# Patient Record
Sex: FEMALE | Race: ASIAN | NOT HISPANIC OR LATINO | URBAN - METROPOLITAN AREA
[De-identification: names, ages, dates, MRNs, and addresses within clinical notes are randomized per-mention and may not be internally consistent; named-entity substitution may affect disease eponyms.]

---

## 2024-10-14 ENCOUNTER — EMERGENCY (EMERGENCY)
Facility: HOSPITAL | Age: 59
LOS: 1 days | Discharge: HOME | End: 2024-10-14
Admitting: EMERGENCY MEDICINE
Payer: COMMERCIAL

## 2024-10-14 VITALS — HEART RATE: 104 BPM | OXYGEN SATURATION: 97 % | RESPIRATION RATE: 17 BRPM

## 2024-10-14 VITALS
HEART RATE: 115 BPM | WEIGHT: 139.99 LBS | OXYGEN SATURATION: 98 % | HEIGHT: 64 IN | RESPIRATION RATE: 16 BRPM | TEMPERATURE: 98 F | SYSTOLIC BLOOD PRESSURE: 179 MMHG | DIASTOLIC BLOOD PRESSURE: 81 MMHG

## 2024-10-14 PROCEDURE — 99284 EMERGENCY DEPT VISIT MOD MDM: CPT | Mod: 25

## 2024-10-14 PROCEDURE — 70450 CT HEAD/BRAIN W/O DYE: CPT | Mod: 26,MC

## 2024-10-14 PROCEDURE — 99284 EMERGENCY DEPT VISIT MOD MDM: CPT

## 2024-10-14 PROCEDURE — 70486 CT MAXILLOFACIAL W/O DYE: CPT | Mod: MC

## 2024-10-14 PROCEDURE — 70486 CT MAXILLOFACIAL W/O DYE: CPT | Mod: 26,MC

## 2024-10-14 PROCEDURE — 70450 CT HEAD/BRAIN W/O DYE: CPT | Mod: MC

## 2024-10-14 NOTE — ED PROVIDER NOTE - PHYSICAL EXAMINATION
GEN: WD/WN, NAD  HEAD: Left sided periorbital swelling and ecchymosis.  No depressed skull fx. No Kraus sign.    NEURO: Alert, oriented. CN II-XII intact. Clear speech.  SILT all ext. Motor 5/5 all ext.  Gait steady.  Romberg neg. F-N intact.  EYE: EOMI. No nystagmus. PERRL, no hyphema.   ENT: Airway patent.  No dental injury. Small amt dry blood in right nostril, mild nasal tenderness, no septal hematoma appreciated. No otorrhea or hemotympanum.  PULM: No resp distress. Lungs CTA bilat.  CV: RRR, S1S2  GI: Abdomen soft, nontender  MSK: Neck with painless ROM; no tenderness over midline cervical region or back. No tenderness over ribcage.  Extremities without tenderness, swelling, or ROM limitation.  SKIN: Intact.  Normal color and turgor.

## 2024-10-14 NOTE — ED PROVIDER NOTE - PATIENT PORTAL LINK FT
You can access the FollowMyHealth Patient Portal offered by [facility] by registering at the following website: [email or web address]. By joining [facility] FollowMyHealth portal, you will also be able to view your health information using other applications (apps) compatible with our system.
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
## 2024-10-14 NOTE — ED PROVIDER NOTE - NSFOLLOWUPINSTRUCTIONS_ED_ALL_ED_FT
You can use Tylenol if needed for pain.  Sleep with head elevated.  Take the antibiotic as prescribed.  Use nasal saline drops.    Follow up with your primary care physician/OMFS (facial surgeon) in the next 1-3 days.  Return to the Emergency Department if you have any new or worsening symptoms, or if you have any concerns.  ====================  Maxillofacial Fracture  A maxillofacial fracture, also called a midface fracture, is a break in the bones of the middle part of the face that form the upper jaw (maxilla). These bones include:  The maxilla.  The cheekbones.  The lower rim and floor of the eye socket (inferior orbital or orbital floor).  The opening to the nasal passages (nasal cavity).  Sometimes, maxillofacial fractures involve multiple facial bones, also called complex fractures, and may partially or completely detach from the skull ([place] fractures). [place] fractures can be very severe and can be life-threatening.    What are the causes?  Maxillofacial fractures are usually caused by strong, blunt force to the midface area. Causes include:  Motor vehicle accidents.  Contact sports accidents.  Combat sports or martial arts.  Injuries in sports that use hard balls or bats.  Falls.  Workplace accidents.  Violent assaults.  What are the signs or symptoms?  Symptoms of this condition include:  Swelling, bruising, and pain, or tenderness of the face.  Bleeding or blood clots in the nose or mouth.  Clear drainage from the nose.  A change in the appearance of the face (facial deformity) and numbness.  A change in how the teeth fit together (malocclusion).  Inability to close the mouth at all.  Double vision, blurry vision, or inability to move the affected eye normally.  Bleeding into the lining of the eyelid or white area of the eye (subconjunctival hemorrhage).  Trouble breathing, swallowing, or speaking.  How is this diagnosed?  This condition may be diagnosed based on your symptoms and a physical exam. The exam involves checking for:  Airway blockage and any life-threatening bleeding.  Facial deformity, such as a gap or dent in the upper jaw that can be felt or moved, or widening and flattening of the face.  Vision problems.  Numbness or paralysis of eye muscles or facial muscles, or both.  Damage to the teeth and to the inside of the mouth and nose.  You may also have tests, such as X-rays or a CT scan, to confirm your diagnosis and determine how severe your fracture is.    How is this treated?  Early treatment    Treatment depends on how severe the fracture is and where it is found. Treatment may start in the emergency room to make sure blood clots or swelling do not block breathing. Treatments may include:  Endotracheal tube. This is a breathing tube that may be put through the nose into the windpipe.  Tracheostomy. This procedure involves making an opening in the lower windpipe to put in a breathing tube.  Antibiotic medicines, pain medicines, and medicines to reduce swelling.  Treatments specific to the type of fracture.  Maxillomandibular fixation    In most cases, this condition may require a procedure to wire the upper teeth to the lower teeth (maxillomandibular fixation). You may need to have your teeth wired together for several weeks to stabilize the fracture during healing. For minor fractures, this may be the only treatment needed.    Fractures that are out of position (displaced) or unstable may require open reduction. This is a surgery to move the broken bones back into position and then support them with plates and screws or wires.    Follow these instructions at home:  Medicines    Take over-the-counter and prescription medicines only as told by your health care provider.  Take your antibiotic medicine as told by your health care provider. Do not stop taking the antibiotic even if you start to feel better.  Maxillomandibular fixation care    If your teeth have been wired together:  Follow instructions for caring for your mouth and teeth (oral hygiene).  Make sure you know what to do if you vomit. You may be given a  to cut the wires off your teeth.  Follow instructions from your health care provider about eating or drinking restrictions. You will need to remain on a liquid and pureed food diet while your teeth are wired together.  Incision care    Two stitched incisions. One is normal. The other is red with pus and infected.  If you have facial incisions, follow instructions from your health care provider about how to take care of your incisions. Make sure you:  Wash your hands with soap and water for at least 20 seconds before and after you change your bandage (dressing). If soap and water are not available, use hand .  Change your dressing as told by your health care provider.  Leave stitches (sutures), skin glue, or adhesive strips in place. These skin closures may need to stay in place for 2 weeks or longer. If adhesive strip edges start to loosen and curl up, you may trim the loose edges. Do not remove adhesive strips completely unless your health care provider tells you to do that.  Check your incision area every day for signs of infection. Check for:  More redness, swelling, or pain.  More fluid or blood.  Warmth.  Pus or a bad smell.  Managing pain, stiffness, and swelling    A bag of ice on a towel on the skin.   If directed, put ice on the affected area. To do this:  Put ice in a plastic bag.  Place a towel between your skin and the bag.  Leave the ice on for 20 minutes, 2–3 times a day.  Remove the ice if your skin turns bright red. This is very important. If you cannot feel pain, heat, or cold, you have a greater risk of damage to the area.  Raise (elevate) your head above the level of your heart while you are sitting or lying down.  General instructions    Do not take baths, swim, or use a hot tub until your health care provider approves. Ask your health care provider if you may take showers. You may only be allowed to take sponge baths.  Return to your normal activities as told by your health care provider. Ask your health care provider what activities are safe for you.  Do not use any products that contain nicotine or tobacco. These products include cigarettes, chewing tobacco, and vaping devices, such as e-cigarettes. If you need help quitting, ask your health care provider.  Do not drink alcohol.  Do not lift anything that is heavier than 10 lb (4.5 kg), or the limit that you are told, until your health care provider says that it is safe.  Keep all follow-up visits. This is important. This includes visits to have sutures removed and wires cut from your teeth.  Contact a health care provider if:  You have chills or a fever.  You notice any signs of infection. These include redness, increased pain, or foul-smelling discharge.  Your pain is not controlled with medicine.  Get help right away if:  You have trouble breathing.  You have a sudden increase in swelling.  You need to cut the wires off your teeth because of vomiting.  These symptoms may represent a serious problem that is an emergency. Do not wait to see if the symptoms will go away. Get medical help right away. Call your local emergency services (911 in the [place] Do not drive yourself to the hospital.    Summary  A maxillofacial fracture is a break in the bones of the middle part of your face. This injury is usually caused by strong, blunt force to the midface area. Fractures in these bones may interfere with breathing, vision, chewing, and talking. Maxillofacial fractures can be very severe.  Treatment of maxillofacial fractures depends on the severity and location of the fracture. Treatment may start in the emergency room to make sure that blood clots or swelling are not blocking breathing.  In most cases, this condition may require a procedure to wire the upper teeth to the lower teeth (maxillomandibular fixation).  Severe fractures may require a surgical procedure (open reduction) to move the broken bones back into place and put in plates and screws or wires.  Follow all home care instructions and keep all follow-up visits.  This information is not intended to replace advice given to you by your health care provider. Make sure you discuss any questions you have with your health care provider. Cold compresses.  You can use Tylenol if needed for pain.  Sleep with head elevated.  Take the antibiotic as prescribed.  Use nasal saline drops.    Follow up with your primary care physician/OMFS (facial surgeon) in the next 1-3 days.  Please discuss your exertional shortness of breath (ongoing for past few years) with your primary care physician.      Return to the Emergency Department if you have any new or worsening symptoms, or if you have any concerns.  ====================  Maxillofacial Fracture  A maxillofacial fracture, also called a midface fracture, is a break in the bones of the middle part of the face that form the upper jaw (maxilla). These bones include:  The maxilla.  The cheekbones.  The lower rim and floor of the eye socket (inferior orbital or orbital floor).  The opening to the nasal passages (nasal cavity).  Sometimes, maxillofacial fractures involve multiple facial bones, also called complex fractures, and may partially or completely detach from the skull ([place] fractures). [place] fractures can be very severe and can be life-threatening.    What are the causes?  Maxillofacial fractures are usually caused by strong, blunt force to the midface area. Causes include:  Motor vehicle accidents.  Contact sports accidents.  Combat sports or martial arts.  Injuries in sports that use hard balls or bats.  Falls.  Workplace accidents.  Violent assaults.  What are the signs or symptoms?  Symptoms of this condition include:  Swelling, bruising, and pain, or tenderness of the face.  Bleeding or blood clots in the nose or mouth.  Clear drainage from the nose.  A change in the appearance of the face (facial deformity) and numbness.  A change in how the teeth fit together (malocclusion).  Inability to close the mouth at all.  Double vision, blurry vision, or inability to move the affected eye normally.  Bleeding into the lining of the eyelid or white area of the eye (subconjunctival hemorrhage).  Trouble breathing, swallowing, or speaking.  How is this diagnosed?  This condition may be diagnosed based on your symptoms and a physical exam. The exam involves checking for:  Airway blockage and any life-threatening bleeding.  Facial deformity, such as a gap or dent in the upper jaw that can be felt or moved, or widening and flattening of the face.  Vision problems.  Numbness or paralysis of eye muscles or facial muscles, or both.  Damage to the teeth and to the inside of the mouth and nose.  You may also have tests, such as X-rays or a CT scan, to confirm your diagnosis and determine how severe your fracture is.    How is this treated?  Early treatment    Treatment depends on how severe the fracture is and where it is found. Treatment may start in the emergency room to make sure blood clots or swelling do not block breathing. Treatments may include:  Endotracheal tube. This is a breathing tube that may be put through the nose into the windpipe.  Tracheostomy. This procedure involves making an opening in the lower windpipe to put in a breathing tube.  Antibiotic medicines, pain medicines, and medicines to reduce swelling.  Treatments specific to the type of fracture.  Maxillomandibular fixation    In most cases, this condition may require a procedure to wire the upper teeth to the lower teeth (maxillomandibular fixation). You may need to have your teeth wired together for several weeks to stabilize the fracture during healing. For minor fractures, this may be the only treatment needed.    Fractures that are out of position (displaced) or unstable may require open reduction. This is a surgery to move the broken bones back into position and then support them with plates and screws or wires.    Follow these instructions at home:  Medicines    Take over-the-counter and prescription medicines only as told by your health care provider.  Take your antibiotic medicine as told by your health care provider. Do not stop taking the antibiotic even if you start to feel better.  Maxillomandibular fixation care    If your teeth have been wired together:  Follow instructions for caring for your mouth and teeth (oral hygiene).  Make sure you know what to do if you vomit. You may be given a  to cut the wires off your teeth.  Follow instructions from your health care provider about eating or drinking restrictions. You will need to remain on a liquid and pureed food diet while your teeth are wired together.  Incision care    Two stitched incisions. One is normal. The other is red with pus and infected.  If you have facial incisions, follow instructions from your health care provider about how to take care of your incisions. Make sure you:  Wash your hands with soap and water for at least 20 seconds before and after you change your bandage (dressing). If soap and water are not available, use hand .  Change your dressing as told by your health care provider.  Leave stitches (sutures), skin glue, or adhesive strips in place. These skin closures may need to stay in place for 2 weeks or longer. If adhesive strip edges start to loosen and curl up, you may trim the loose edges. Do not remove adhesive strips completely unless your health care provider tells you to do that.  Check your incision area every day for signs of infection. Check for:  More redness, swelling, or pain.  More fluid or blood.  Warmth.  Pus or a bad smell.  Managing pain, stiffness, and swelling    A bag of ice on a towel on the skin.   If directed, put ice on the affected area. To do this:  Put ice in a plastic bag.  Place a towel between your skin and the bag.  Leave the ice on for 20 minutes, 2–3 times a day.  Remove the ice if your skin turns bright red. This is very important. If you cannot feel pain, heat, or cold, you have a greater risk of damage to the area.  Raise (elevate) your head above the level of your heart while you are sitting or lying down.  General instructions    Do not take baths, swim, or use a hot tub until your health care provider approves. Ask your health care provider if you may take showers. You may only be allowed to take sponge baths.  Return to your normal activities as told by your health care provider. Ask your health care provider what activities are safe for you.  Do not use any products that contain nicotine or tobacco. These products include cigarettes, chewing tobacco, and vaping devices, such as e-cigarettes. If you need help quitting, ask your health care provider.  Do not drink alcohol.  Do not lift anything that is heavier than 10 lb (4.5 kg), or the limit that you are told, until your health care provider says that it is safe.  Keep all follow-up visits. This is important. This includes visits to have sutures removed and wires cut from your teeth.  Contact a health care provider if:  You have chills or a fever.  You notice any signs of infection. These include redness, increased pain, or foul-smelling discharge.  Your pain is not controlled with medicine.  Get help right away if:  You have trouble breathing.  You have a sudden increase in swelling.  You need to cut the wires off your teeth because of vomiting.  These symptoms may represent a serious problem that is an emergency. Do not wait to see if the symptoms will go away. Get medical help right away. Call your local emergency services (911 in the [place] Do not drive yourself to the hospital.    Summary  A maxillofacial fracture is a break in the bones of the middle part of your face. This injury is usually caused by strong, blunt force to the midface area. Fractures in these bones may interfere with breathing, vision, chewing, and talking. Maxillofacial fractures can be very severe.  Treatment of maxillofacial fractures depends on the severity and location of the fracture. Treatment may start in the emergency room to make sure that blood clots or swelling are not blocking breathing.  In most cases, this condition may require a procedure to wire the upper teeth to the lower teeth (maxillomandibular fixation).  Severe fractures may require a surgical procedure (open reduction) to move the broken bones back into place and put in plates and screws or wires.  Follow all home care instructions and keep all follow-up visits.  This information is not intended to replace advice given to you by your health care provider. Make sure you discuss any questions you have with your health care provider.
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
## 2024-10-14 NOTE — ED PROVIDER NOTE - CARE PROVIDER_API CALL
Christiano Leon  Oral/Maxillofacial Surgery  [address] [place] [address], [address]  [place] [address]  Phone: [phone]  Fax: [phone]  Follow Up Time:

## 2024-10-14 NOTE — ED ADULT NURSE NOTE - NSFALLUNIVINTERV_ED_ALL_ED
Bed/Stretcher in lowest position, wheels locked, appropriate side rails in place/Call bell, personal items and telephone in reach/Instruct patient to call for assistance before getting out of bed/chair/stretcher/Non-slip footwear applied when patient is off stretcher/[place] to call system/Physically safe environment - no spills, clutter or unnecessary equipment/Purposeful proactive rounding/Room/bathroom lighting operational, light cord in reach
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
## 2024-10-17 DIAGNOSIS — R51.9 HEADACHE, UNSPECIFIED: ICD-10-CM
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
## 2024-10-17 DIAGNOSIS — S02.40DA MAXILLARY FRACTURE, LEFT SIDE, INITIAL ENCOUNTER FOR CLOSED FRACTURE: ICD-10-CM
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
## 2024-10-17 DIAGNOSIS — Z88.6 ALLERGY STATUS TO ANALGESIC AGENT: ICD-10-CM

## 2024-11-06 ENCOUNTER — APPOINTMENT (OUTPATIENT)
Dept: INTERNAL MEDICINE | Facility: CLINIC | Age: 59
End: 2024-11-06